# Patient Record
(demographics unavailable — no encounter records)

---

## 2025-05-05 NOTE — PHYSICAL EXAM
[Healthy Appearing] : healthy appearing [Well Nourished] : well nourished [Interactive] : interactive [Normal Appearance] : normal appearance [Well formed] : well formed [Normally Set] : normally set [Normal S1 and S2] : normal S1 and S2 [Clear to Ausculation Bilaterally] : clear to auscultation bilaterally [Abdomen Soft] : soft [Abdomen Tenderness] : non-tender [1] : was Kin stage 1 [Testes] : normal [Normal] : normal  [Murmur] : no murmurs [de-identified] : grossly intact

## 2025-05-05 NOTE — HISTORY OF PRESENT ILLNESS
[Headaches] : no headaches [Polyuria] : no polyuria [Polydipsia] : no polydipsia [Constipation] : no constipation [Cold Intolerance] : no cold intolerance [Heat Intolerance] : no heat intolerance [Fatigue] : no fatigue [Abdominal Pain] : no abdominal pain [Weight Loss] : no weight loss [Vomiting] : no vomiting [FreeTextEntry2] : Anusha is a 3 year 1 month old male with autism spectrum disorder referred for high cholesterol  Mother report that the pediatrician obtained a lipid profile as part of routine blood work. nonfasting blood work from 1/10/25: Total cholesterol 224 mg/dL (H),  mg/dL (H), HDL 49 mg/dL, LDL 148mg/dL, Non  mg/dL. Pediatrician referred Anusha to nutrition. While mom was looking into nutritionists, she found the endocrine division and decided to make an appointment.   He has been seeing a nutritionist and they follow a care plan for the meals. They have reduced the consumption of simple carbohydrates, and increased fiber and protein intake. Changes started mid-March. Nutritionist is planning to repeat bloodwork by June.   Anusha has intermittent constipation at baseline.    His ethnic background is [place]. Maternal grandfather, paternal grandmother, paternal aunt have high cholesterol. Paternal grandfather had an MI in his 60's and takes medication for high cholesterol. Maternal grandmother has hypertension. Mother has hashimoto thyroiditis. No other family history of autoimmune disorders.  Mom is a solid organ transplant nurse.

## 2025-05-05 NOTE — CONSULT LETTER
[Dear  ___] : Dear  [unfilled], [Consult Letter:] : I had the pleasure of evaluating your patient, [unfilled]. [Please see my note below.] : Please see my note below. [Consult Closing:] : Thank you very much for allowing me to participate in the care of this patient.  If you have any questions, please do not hesitate to contact me. [Sincerely,] : Sincerely, [FreeTextEntry3] : Bartolo Roberts D.O.  for Pediatric Endocrinology Fellowship Residency Clerkship Director for Division  of Pediatric Endocrinology [facility]

## 2025-05-05 NOTE — PAST MEDICAL HISTORY
[At ___ Weeks Gestation] : at [unfilled] weeks gestation [ Section] : by  section [Failure to Progress] : failure to progress [Speech Therapy] : speech therapy [Age Appropriate] : age appropriate developmental milestones not met [de-identified] : pregnancy complicated by gestational diabetes.  [FreeTextEntry1] : 8lb 4 oz [FreeTextEntry3] : speech delay [FreeTextEntry5] : MARTIN therapy 5 days per week. Speech therapy 3 days per week.